# Patient Record
Sex: MALE | Race: OTHER | Employment: UNEMPLOYED | URBAN - METROPOLITAN AREA
[De-identification: names, ages, dates, MRNs, and addresses within clinical notes are randomized per-mention and may not be internally consistent; named-entity substitution may affect disease eponyms.]

---

## 2022-08-22 ENCOUNTER — APPOINTMENT (OUTPATIENT)
Dept: GENERAL RADIOLOGY | Age: 22
End: 2022-08-22

## 2022-08-22 ENCOUNTER — HOSPITAL ENCOUNTER (EMERGENCY)
Age: 22
Discharge: HOME | End: 2022-08-22
Attending: EMERGENCY MEDICINE

## 2022-08-22 VITALS
TEMPERATURE: 97.4 F | RESPIRATION RATE: 17 BRPM | HEART RATE: 61 BPM | DIASTOLIC BLOOD PRESSURE: 74 MMHG | SYSTOLIC BLOOD PRESSURE: 127 MMHG | OXYGEN SATURATION: 100 %

## 2022-08-22 DIAGNOSIS — M25.512 LEFT SHOULDER PAIN, UNSPECIFIED CHRONICITY: Primary | ICD-10-CM

## 2022-08-22 DIAGNOSIS — R07.9 CHEST PAIN, UNSPECIFIED TYPE: ICD-10-CM

## 2022-08-22 LAB
ALBUMIN SERPL-MCNC: 5 G/DL (ref 3.5–5.2)
ALP BLD-CCNC: 55 U/L (ref 40–129)
ALT SERPL-CCNC: 15 U/L (ref 0–40)
ANION GAP SERPL CALCULATED.3IONS-SCNC: 11 MMOL/L (ref 7–16)
AST SERPL-CCNC: 24 U/L (ref 0–39)
BACTERIA: NORMAL /HPF
BASOPHILS ABSOLUTE: 0.05 E9/L (ref 0–0.2)
BASOPHILS RELATIVE PERCENT: 0.6 % (ref 0–2)
BILIRUB SERPL-MCNC: 0.6 MG/DL (ref 0–1.2)
BILIRUBIN URINE: NEGATIVE
BLOOD, URINE: NEGATIVE
BUN BLDV-MCNC: 14 MG/DL (ref 6–20)
CALCIUM SERPL-MCNC: 9.7 MG/DL (ref 8.6–10.2)
CHLORIDE BLD-SCNC: 105 MMOL/L (ref 98–107)
CLARITY: CLEAR
CO2: 24 MMOL/L (ref 22–29)
COLOR: YELLOW
CREAT SERPL-MCNC: 1 MG/DL (ref 0.7–1.2)
EKG ATRIAL RATE: 53 BPM
EKG P AXIS: 70 DEGREES
EKG P-R INTERVAL: 132 MS
EKG Q-T INTERVAL: 444 MS
EKG QRS DURATION: 102 MS
EKG QTC CALCULATION (BAZETT): 416 MS
EKG R AXIS: 87 DEGREES
EKG T AXIS: 63 DEGREES
EKG VENTRICULAR RATE: 53 BPM
EOSINOPHILS ABSOLUTE: 0.11 E9/L (ref 0.05–0.5)
EOSINOPHILS RELATIVE PERCENT: 1.4 % (ref 0–6)
GFR AFRICAN AMERICAN: >60
GFR NON-AFRICAN AMERICAN: >60 ML/MIN/1.73
GLUCOSE BLD-MCNC: 91 MG/DL (ref 74–99)
GLUCOSE URINE: NEGATIVE MG/DL
HCT VFR BLD CALC: 49.6 % (ref 37–54)
HEMOGLOBIN: 16.9 G/DL (ref 12.5–16.5)
IMMATURE GRANULOCYTES #: 0.05 E9/L
IMMATURE GRANULOCYTES %: 0.6 % (ref 0–5)
KETONES, URINE: NEGATIVE MG/DL
LEUKOCYTE ESTERASE, URINE: NEGATIVE
LYMPHOCYTES ABSOLUTE: 3.2 E9/L (ref 1.5–4)
LYMPHOCYTES RELATIVE PERCENT: 40.8 % (ref 20–42)
MCH RBC QN AUTO: 29.9 PG (ref 26–35)
MCHC RBC AUTO-ENTMCNC: 34.1 % (ref 32–34.5)
MCV RBC AUTO: 87.8 FL (ref 80–99.9)
MONOCYTES ABSOLUTE: 0.75 E9/L (ref 0.1–0.95)
MONOCYTES RELATIVE PERCENT: 9.6 % (ref 2–12)
NEUTROPHILS ABSOLUTE: 3.69 E9/L (ref 1.8–7.3)
NEUTROPHILS RELATIVE PERCENT: 47 % (ref 43–80)
NITRITE, URINE: NEGATIVE
PDW BLD-RTO: 12.5 FL (ref 11.5–15)
PH UA: 6 (ref 5–9)
PLATELET # BLD: 244 E9/L (ref 130–450)
PMV BLD AUTO: 10.5 FL (ref 7–12)
POTASSIUM REFLEX MAGNESIUM: 4 MMOL/L (ref 3.5–5)
PROTEIN UA: NORMAL MG/DL
RBC # BLD: 5.65 E12/L (ref 3.8–5.8)
RBC UA: NORMAL /HPF (ref 0–2)
SODIUM BLD-SCNC: 140 MMOL/L (ref 132–146)
SPECIFIC GRAVITY UA: >=1.03 (ref 1–1.03)
TOTAL CK: 388 U/L (ref 20–200)
TOTAL PROTEIN: 7.8 G/DL (ref 6.4–8.3)
TROPONIN, HIGH SENSITIVITY: <6 NG/L (ref 0–11)
TROPONIN, HIGH SENSITIVITY: <6 NG/L (ref 0–11)
UROBILINOGEN, URINE: 1 E.U./DL
WBC # BLD: 7.9 E9/L (ref 4.5–11.5)
WBC UA: NORMAL /HPF (ref 0–5)

## 2022-08-22 PROCEDURE — 82550 ASSAY OF CK (CPK): CPT

## 2022-08-22 PROCEDURE — 80053 COMPREHEN METABOLIC PANEL: CPT

## 2022-08-22 PROCEDURE — 6360000002 HC RX W HCPCS: Performed by: STUDENT IN AN ORGANIZED HEALTH CARE EDUCATION/TRAINING PROGRAM

## 2022-08-22 PROCEDURE — 81001 URINALYSIS AUTO W/SCOPE: CPT

## 2022-08-22 PROCEDURE — 93005 ELECTROCARDIOGRAM TRACING: CPT | Performed by: PHYSICIAN ASSISTANT

## 2022-08-22 PROCEDURE — 99285 EMERGENCY DEPT VISIT HI MDM: CPT

## 2022-08-22 PROCEDURE — 96374 THER/PROPH/DIAG INJ IV PUSH: CPT

## 2022-08-22 PROCEDURE — 73030 X-RAY EXAM OF SHOULDER: CPT

## 2022-08-22 PROCEDURE — 84484 ASSAY OF TROPONIN QUANT: CPT

## 2022-08-22 PROCEDURE — 36415 COLL VENOUS BLD VENIPUNCTURE: CPT

## 2022-08-22 PROCEDURE — 71046 X-RAY EXAM CHEST 2 VIEWS: CPT

## 2022-08-22 PROCEDURE — 85025 COMPLETE CBC W/AUTO DIFF WBC: CPT

## 2022-08-22 RX ORDER — IBUPROFEN 800 MG/1
800 TABLET ORAL EVERY 8 HOURS PRN
Qty: 20 TABLET | Refills: 0 | Status: SHIPPED | OUTPATIENT
Start: 2022-08-22

## 2022-08-22 RX ORDER — CYCLOBENZAPRINE HCL 5 MG
5 TABLET ORAL 2 TIMES DAILY PRN
Qty: 6 TABLET | Refills: 0 | Status: SHIPPED | OUTPATIENT
Start: 2022-08-22 | End: 2022-08-25

## 2022-08-22 RX ORDER — KETOROLAC TROMETHAMINE 30 MG/ML
15 INJECTION, SOLUTION INTRAMUSCULAR; INTRAVENOUS ONCE
Status: COMPLETED | OUTPATIENT
Start: 2022-08-22 | End: 2022-08-22

## 2022-08-22 RX ADMIN — KETOROLAC TROMETHAMINE 15 MG: 30 INJECTION, SOLUTION INTRAMUSCULAR; INTRAVENOUS at 17:41

## 2022-08-22 ASSESSMENT — ENCOUNTER SYMPTOMS
SHORTNESS OF BREATH: 0
RHINORRHEA: 0
VOMITING: 0
VOICE CHANGE: 0
BACK PAIN: 0
TROUBLE SWALLOWING: 0
DIARRHEA: 0
COUGH: 0
ABDOMINAL PAIN: 0
NAUSEA: 0

## 2022-08-22 ASSESSMENT — PAIN SCALES - GENERAL: PAINLEVEL_OUTOF10: 8

## 2022-08-22 NOTE — ED NOTES
Department of Emergency Medicine    FIRST PROVIDER TRIAGE NOTE             Independent MLP           8/22/22  11:33 AM EDT    Date of Encounter: 8/22/22   MRN: [id number]    Vitals:    08/22/22 1131 08/22/22 1134   BP:  126/85   Pulse: 59    Resp:  16   Temp: 97.4 °F (36.3 °C)    TempSrc: Oral    SpO2: 100%       HPI: Brain Alexis is a 25 y.o. male who presents to the ED for Numbness (Left arm pain starting 40 min ago. Pt reports pain to left leg yesterday. Pt denies any injury)  Chest pain- describes as \"stabbing\"   Pain in left shoulder and goes into chest   Had left leg pain yesterday but that has resolved     [place] interpretor used     ROS: Negative for abd pain, fever, vomiting, or diarrhea. Physical Exam:   Gen Appearance/Constitutional: alert  CV: regular rate     Initial Plan of Care: All treatment areas with department are currently occupied. Plan to order/Initiate the following while awaiting opening in ED: labs, EKG, and imaging studies.     Initial Plan of Care: Initiate Treatment-Testing, Proceed toTreatment Area When Bed Available for ED Attending/MLP to Continue Care    Electronically signed by Asim Griffin PA-C   DD: 8/22/22       Asim Griffin PA-C  08/22/22 1133

## 2022-08-23 NOTE — DISCHARGE INSTRUCTIONS
Up to establish with primary care as needed    Return to the emergency department for new or worsening symptoms

## 2022-08-23 NOTE — ED PROVIDER NOTES
Patient is a 69-year-old [place] citizen-speaking male presents to the emergency department with complaint of left shoulder pain and left chest pain. Patient states that yesterday the sole of his left foot was hurting and today that has resolved however now his left chest hurts as well as his left shoulder. Patient works as a . He states that he has had multiple days of increasing pain to the left shoulder especially on movement. Patient also states left-sided chest pain that radiates into his left shoulder, not otherwise radiating, nothing makes it better, movement palpation and deep breathing make it worse, moderate in intensity, sharp, intermittent. Patient denies any trauma to the shoulder or any lifting injury. Patient denies any nausea or vomiting, true shortness of breath, does state it hurts more when he breathes. Additionally patient states that his left shoulder pain also began in his left neck and radiated into his left shoulder. Patient denies any abdominal pain, focal neurodeficits, headache, blurred vision or double vision. Patient did take an ibuprofen at home without any relief. Patient does not have any cardiac history. The history is provided by the patient. The history is limited by a language barrier. A  was used. Review of Systems   Constitutional:  Negative for chills, fatigue and fever. HENT:  Negative for congestion, rhinorrhea, trouble swallowing and voice change. Eyes:  Negative for visual disturbance. Respiratory:  Negative for cough and shortness of breath. Cardiovascular:  Negative for chest pain and palpitations. Gastrointestinal:  Negative for abdominal pain, diarrhea, nausea and vomiting. Endocrine: Negative for polyuria. Genitourinary:  Negative for dysuria, frequency, hematuria and urgency. Musculoskeletal:  Positive for arthralgias. Negative for back pain, myalgias, neck pain and neck stiffness.    Skin:  Negative for rash and wound. Allergic/Immunologic: Negative for immunocompromised state. Neurological:  Negative for dizziness, syncope, weakness, light-headedness, numbness and headaches. Psychiatric/Behavioral:  Negative for confusion. The patient is not nervous/anxious. Physical Exam  Vitals and nursing note reviewed. Constitutional:       General: He is awake. He is not in acute distress. Appearance: He is normal weight. He is not ill-appearing. HENT:      Head: Normocephalic and atraumatic. Mouth/Throat:      Mouth: Mucous membranes are moist.      Pharynx: Oropharynx is clear. Eyes:      Extraocular Movements: Extraocular movements intact. Pupils: Pupils are equal, round, and reactive to light. Cardiovascular:      Rate and Rhythm: Normal rate and regular rhythm. Pulses: Normal pulses. Heart sounds: Normal heart sounds. Pulmonary:      Effort: Pulmonary effort is normal.      Breath sounds: Normal breath sounds. Abdominal:      General: There is no distension. Palpations: Abdomen is soft. Tenderness: There is no abdominal tenderness. Musculoskeletal:      Right shoulder: Tenderness present. No swelling, effusion, bony tenderness or crepitus. Decreased range of motion. Normal strength. Normal pulse. Cervical back: Normal range of motion. Comments: Patient stating pain with range of motion to the left shoulder. Pain with lateral abduction above 90 degrees, anterior arm raise above 90 degrees. Shoulder is tender. No erythema, warmth or redness, no swelling. Skin:     General: Skin is warm and dry. Capillary Refill: Capillary refill takes less than 2 seconds. Neurological:      General: No focal deficit present. Mental Status: He is alert and oriented to person, place, and time. Psychiatric:         Behavior: Behavior is cooperative.       MDM  Number of Diagnoses or Management Options  Chest pain, unspecified type  Left shoulder pain, unspecified chronicity  Diagnosis management comments: Patient is a 59-year-old male who presents to the emergency department for left chest pain and shoulder pain. Patient states been going on for a few days. Patient works in construction. Patient presents awake, alert, following all commands, speaking in full sentences, no apparent distress. Vital signs were noted. Physical exam shows decreased range of motion and tenderness to the left shoulder, no decrease in strength, neurologically intact. No signs of trauma. Work-up including labs are generally unremarkable, PERC is negative. Patient is low risk for cardiac. EKG was reviewed. Chest x-ray and shoulder x-ray were reviewed. Patient was treated with Toradol with good relief in his symptoms. Patient with likely musculoskeletal pain to the left shoulder. Patient was referred to family medicine for establishment and follow-up. Patient given return instructions. Patient will be treated with muscle relaxant and ibuprofen outpatient. All questions were answered at the bedside. Amount and/or Complexity of Data Reviewed  Clinical lab tests: ordered and reviewed  Tests in the radiology section of CPT®: ordered and reviewed       ED Course as of 08/22/22 2134   [facility] Aug 22, 2022   1823 EKG: This EKG is signed and interpreted by me. Rate: 53  Rhythm: Sinus  Interpretation: Sinus bradycardia, incomplete right bundle branch block, normal NH interval, normal QRS, normal QT interval, no acute ST or T wave changes  Comparison: no previous EKG available   [JA]   2021 Patient is a 59-year-old male with no significant past medical history presenting with left shoulder pain which is worse with movement and resolved with Toradol administered by the resident physician. No falls or trauma. He denies recent travel or immobilization, leg edema, calf tenderness, syncope, hemoptysis, hormone use, and history of DVT/PE.   States he did have some vague chest discomfort the other day which has resolved. No exertional symptoms. No family history of sudden cardiac death or personal cardiac history. No abdominal pain, emesis, or diarrhea. On examination, the patient is well-appearing. He is asymptomatic after treatment with Toradol. He has no midline cervical, thoracic, or lumbar tenderness. He has no abdominal tenderness. Heart lung sounds are normal.  No leg edema or calf tenderness. No warmth or erythema to his left shoulder with normal range of motion. No obvious deformity or wounds. He has low cardiac risk and no evidence of ACS. He is PERC negative. PE not suspected. Likely musculoskeletal pain. We will treat symptomatically and give PCP referral. [JA]      ED Course User Index  [JA] Lorenzo Oleary MD        EKG: This EKG is signed and interpreted by me. Rate: 58  Rhythm: Sinus  Interpretation:  sinus sabrina rhythm, normal AR interval, normal QRS, normal QT interval, no acute ST or T wave changes  Comparison: no previous EKG available       ED Course as of 08/22/22 2134   [facility] Aug 22, 2022   1823 EKG: This EKG is signed and interpreted by me. Rate: 53  Rhythm: Sinus  Interpretation: Sinus bradycardia, incomplete right bundle branch block, normal AR interval, normal QRS, normal QT interval, no acute ST or T wave changes  Comparison: no previous EKG available   [JA]   2021 Patient is a 79-year-old male with no significant past medical history presenting with left shoulder pain which is worse with movement and resolved with Toradol administered by the resident physician. No falls or trauma. He denies recent travel or immobilization, leg edema, calf tenderness, syncope, hemoptysis, hormone use, and history of DVT/PE. States he did have some vague chest discomfort the other day which has resolved. No exertional symptoms. No family history of sudden cardiac death or personal cardiac history. No abdominal pain, emesis, or diarrhea.   On examination, the patient is well-appearing. He is asymptomatic after treatment with Toradol. He has no midline cervical, thoracic, or lumbar tenderness. He has no abdominal tenderness. Heart lung sounds are normal.  No leg edema or calf tenderness. No warmth or erythema to his left shoulder with normal range of motion. No obvious deformity or wounds. He has low cardiac risk and no evidence of ACS. He is PERC negative. PE not suspected. Likely musculoskeletal pain. We will treat symptomatically and give PCP referral. [JA]      ED Course User Index  [OLIVIA] Vy Gonzales MD       --------------------------------------------- PAST HISTORY ---------------------------------------------  Past Medical History:  has no past medical history on file. Past Surgical History:  has no past surgical history on file. Social History:  reports that he has been smoking cigarettes. He has never used smokeless tobacco. He reports current alcohol use. Family History: family history is not on file. The patients home medications have been reviewed. Allergies: Patient has no known allergies.     -------------------------------------------------- RESULTS -------------------------------------------------  Labs:  Results for orders placed or performed during the hospital encounter of 08/22/22   CBC with Auto Differential   Result Value Ref Range    WBC 7.9 4.5 - 11.5 E9/L    RBC 5.65 3.80 - 5.80 E12/L    Hemoglobin 16.9 (H) 12.5 - 16.5 g/dL    Hematocrit 49.6 37.0 - 54.0 %    MCV 87.8 80.0 - 99.9 fL    MCH 29.9 26.0 - 35.0 pg    MCHC 34.1 32.0 - 34.5 %    RDW 12.5 11.5 - 15.0 fL    Platelets 054 639 - 713 E9/L    MPV 10.5 7.0 - 12.0 fL    Neutrophils % 47.0 43.0 - 80.0 %    Immature Granulocytes % 0.6 0.0 - 5.0 %    Lymphocytes % 40.8 20.0 - 42.0 %    Monocytes % 9.6 2.0 - 12.0 %    Eosinophils % 1.4 0.0 - 6.0 %    Basophils % 0.6 0.0 - 2.0 %    Neutrophils Absolute 3.69 1.80 - 7.30 E9/L    Immature Granulocytes # 0.05 E9/L    Lymphocytes Absolute 3.20 1.50 - 4.00 E9/L    Monocytes Absolute 0.75 0.10 - 0.95 E9/L    Eosinophils Absolute 0.11 0.05 - 0.50 E9/L    Basophils Absolute 0.05 0.00 - 0.20 E9/L   Comprehensive Metabolic Panel w/ Reflex to MG   Result Value Ref Range    Sodium 140 132 - 146 mmol/L    Potassium reflex Magnesium 4.0 3.5 - 5.0 mmol/L    Chloride 105 98 - 107 mmol/L    CO2 24 22 - 29 mmol/L    Anion Gap 11 7 - 16 mmol/L    Glucose 91 74 - 99 mg/dL    BUN 14 6 - 20 mg/dL    Creatinine 1.0 0.7 - 1.2 mg/dL    GFR Non-African American >60 >=60 mL/min/1.73    GFR African American >60     Calcium 9.7 8.6 - 10.2 mg/dL    Total Protein 7.8 6.4 - 8.3 g/dL    Albumin 5.0 3.5 - 5.2 g/dL    Total Bilirubin 0.6 0.0 - 1.2 mg/dL    Alkaline Phosphatase 55 40 - 129 U/L    ALT 15 0 - 40 U/L    AST 24 0 - 39 U/L   Troponin   Result Value Ref Range    Troponin, High Sensitivity <6 0 - 11 ng/L   CK   Result Value Ref Range    Total  (H) 20 - 200 U/L   Urinalysis   Result Value Ref Range    Color, UA Yellow Straw/Yellow    Clarity, UA Clear Clear    Glucose, Ur Negative Negative mg/dL    Bilirubin Urine Negative Negative    Ketones, Urine Negative Negative mg/dL    Specific Gravity, UA >=1.030 1.005 - 1.030    Blood, Urine Negative Negative    pH, UA 6.0 5.0 - 9.0    Protein, UA TRACE Negative mg/dL    Urobilinogen, Urine 1.0 <2.0 E.U./dL    Nitrite, Urine Negative Negative    Leukocyte Esterase, Urine Negative Negative   Microscopic Urinalysis   Result Value Ref Range    WBC, UA NONE 0 - 5 /HPF    RBC, UA NONE 0 - 2 /HPF    Bacteria, UA NONE SEEN None Seen /HPF   Troponin   Result Value Ref Range    Troponin, High Sensitivity <6 0 - 11 ng/L   EKG 12 Lead   Result Value Ref Range    Ventricular Rate 53 BPM    Atrial Rate 53 BPM    P-R Interval 132 ms    QRS Duration 102 ms    Q-T Interval 444 ms    QTc Calculation (Bazett) 416 ms    P Axis 70 degrees    R Axis 87 degrees    T Axis 63 degrees       Radiology:  XR CHEST (2 VW)   Final Result   Unremarkable left shoulder and two view chest.         XR SHOULDER LEFT (MIN 2 VIEWS)   Final Result   Unremarkable left shoulder and two view chest.           ------------------------- NURSING NOTES AND VITALS REVIEWED ---------------------------  Date / Time Roomed:  8/22/2022  3:57 PM  ED Bed Assignment:  Rachel Hatfield    The nursing notes within the ED encounter and vital signs as below have been reviewed. /85   Pulse 59   Temp 97.4 °F (36.3 °C) (Oral)   Resp 16   SpO2 100%   Oxygen Saturation Interpretation: Normal    ------------------------------------------ PROGRESS NOTES ------------------------------------------  8:38 PM EDT  I have spoken with the patient and discussed todays results, in addition to providing specific details for the plan of care and counseling regarding the diagnosis and prognosis. Their questions are answered at this time and they are agreeable with the plan. I discussed at length with them reasons for immediate return here for re evaluation. They will followup with their primary care physician by calling their office tomorrow. --------------------------------- ADDITIONAL PROVIDER NOTES ---------------------------------  At this time the patient is without objective evidence of an acute process requiring hospitalization or inpatient management. They have remained hemodynamically stable throughout their entire ED visit and are stable for discharge with outpatient follow-up. The plan has been discussed in detail and they are aware of the specific conditions for emergent return, as well as the importance of follow-up. New Prescriptions    CYCLOBENZAPRINE (FLEXERIL) 5 MG TABLET    Take 1 tablet by mouth 2 times daily as needed for Muscle spasms    IBUPROFEN (ADVIL;MOTRIN) 800 MG TABLET    Take 1 tablet by mouth every 8 hours as needed for Pain       Diagnosis:  1. Left shoulder pain, unspecified chronicity    2.  Chest pain, unspecified type Disposition:  Patient's disposition: Discharge to home  Patient's condition is stable. 8/22/22, 8:38 PM EDT.     This note is prepared by Candy Woo MD -PGY- 3               Candy Woo MD  Resident  08/22/22 0829